# Patient Record
Sex: FEMALE | Race: BLACK OR AFRICAN AMERICAN | NOT HISPANIC OR LATINO | Employment: STUDENT | URBAN - METROPOLITAN AREA
[De-identification: names, ages, dates, MRNs, and addresses within clinical notes are randomized per-mention and may not be internally consistent; named-entity substitution may affect disease eponyms.]

---

## 2024-04-12 ENCOUNTER — OFFICE VISIT (OUTPATIENT)
Dept: ORTHOPEDIC SURGERY | Facility: HOSPITAL | Age: 34
End: 2024-04-12
Payer: COMMERCIAL

## 2024-04-12 VITALS — HEIGHT: 66 IN | WEIGHT: 205 LBS | BODY MASS INDEX: 32.95 KG/M2

## 2024-04-12 DIAGNOSIS — M79.672 LEFT FOOT PAIN: ICD-10-CM

## 2024-04-12 RX ORDER — AMLODIPINE BESYLATE 5 MG/1
5 TABLET ORAL
COMMUNITY
Start: 2023-10-06

## 2024-04-12 RX ORDER — MONTELUKAST SODIUM 10 MG/1
1 TABLET ORAL NIGHTLY
COMMUNITY
Start: 2015-06-18

## 2024-04-12 ASSESSMENT — PAIN SCALES - GENERAL: PAINLEVEL_OUTOF10: 4

## 2024-04-12 ASSESSMENT — PAIN - FUNCTIONAL ASSESSMENT: PAIN_FUNCTIONAL_ASSESSMENT: 0-10

## 2024-04-12 ASSESSMENT — PAIN DESCRIPTION - DESCRIPTORS: DESCRIPTORS: THROBBING;SHARP

## 2024-04-15 ENCOUNTER — HOSPITAL ENCOUNTER (OUTPATIENT)
Dept: RADIOLOGY | Facility: HOSPITAL | Age: 34
Discharge: HOME | End: 2024-04-15
Payer: COMMERCIAL

## 2024-04-15 ENCOUNTER — OFFICE VISIT (OUTPATIENT)
Dept: ORTHOPEDIC SURGERY | Facility: HOSPITAL | Age: 34
End: 2024-04-15
Payer: COMMERCIAL

## 2024-04-15 VITALS — WEIGHT: 205 LBS | BODY MASS INDEX: 32.95 KG/M2 | HEIGHT: 66 IN

## 2024-04-15 DIAGNOSIS — S92.512A CLOSED DISPLACED FRACTURE OF PROXIMAL PHALANX OF LESSER TOE OF LEFT FOOT, INITIAL ENCOUNTER: ICD-10-CM

## 2024-04-15 DIAGNOSIS — M79.672 LEFT FOOT PAIN: ICD-10-CM

## 2024-04-15 DIAGNOSIS — M79.672 LEFT FOOT PAIN: Primary | ICD-10-CM

## 2024-04-15 PROCEDURE — 99214 OFFICE O/P EST MOD 30 MIN: CPT | Performed by: PHYSICIAN ASSISTANT

## 2024-04-15 PROCEDURE — 1036F TOBACCO NON-USER: CPT | Performed by: PHYSICIAN ASSISTANT

## 2024-04-15 PROCEDURE — 73660 X-RAY EXAM OF TOE(S): CPT | Mod: LT

## 2024-04-15 ASSESSMENT — PAIN DESCRIPTION - DESCRIPTORS: DESCRIPTORS: THROBBING;TINGLING

## 2024-04-15 ASSESSMENT — PAIN - FUNCTIONAL ASSESSMENT: PAIN_FUNCTIONAL_ASSESSMENT: 0-10

## 2024-04-15 ASSESSMENT — PAIN SCALES - GENERAL: PAINLEVEL_OUTOF10: 6

## 2024-04-25 ENCOUNTER — HOSPITAL ENCOUNTER (OUTPATIENT)
Dept: RADIOLOGY | Facility: EXTERNAL LOCATION | Age: 34
Discharge: HOME | End: 2024-04-25
Payer: COMMERCIAL

## 2024-05-03 ENCOUNTER — APPOINTMENT (OUTPATIENT)
Dept: RADIOLOGY | Facility: HOSPITAL | Age: 34
End: 2024-05-03
Payer: COMMERCIAL

## 2024-05-06 ENCOUNTER — OFFICE VISIT (OUTPATIENT)
Dept: ORTHOPEDIC SURGERY | Facility: HOSPITAL | Age: 34
End: 2024-05-06
Payer: COMMERCIAL

## 2024-05-06 ENCOUNTER — HOSPITAL ENCOUNTER (OUTPATIENT)
Dept: RADIOLOGY | Facility: HOSPITAL | Age: 34
Discharge: HOME | End: 2024-05-06
Payer: COMMERCIAL

## 2024-05-06 DIAGNOSIS — M79.672 LEFT FOOT PAIN: ICD-10-CM

## 2024-05-06 PROCEDURE — 99213 OFFICE O/P EST LOW 20 MIN: CPT | Performed by: PHYSICIAN ASSISTANT

## 2024-05-06 PROCEDURE — 73630 X-RAY EXAM OF FOOT: CPT | Mod: LEFT SIDE | Performed by: RADIOLOGY

## 2024-05-06 PROCEDURE — 73630 X-RAY EXAM OF FOOT: CPT | Mod: LT

## 2024-05-10 NOTE — PROGRESS NOTES
NPV-   History of Present Illness  33 y.o.female here for fuv 4th toe pain  1. Left foot pain  XR toe left 2+ views    XR toe left 2+ views         Mechanism of injury: stubbed toe  Date of Injury/Pain: 4/12/24  Location of pain: 4th toe  Frequency of Pain: worse with walking  Associated symptoms? Swelling.  Previous treatment? UC, walk in, post op shoe    27 point review of systems negative except what is stated in HPI    On examination of the left ankle/foot:  Normal gait  Normal alignment  Minimal swelling; No ecchymosis or erythema. Skin intact; no ulcers or lesions.   Normal ROM in  ankle plantarflexion, dorsiflexion, inversion and eversion; normal ROM in 2nd, 3rd, and 5th toe flexion/extension and 1st MTP flexion/extension; decreased in 4th toe  Normal strength in ankle plantarflexion, dorsiflexion, inversion and eversion; normal strength with great toe flexion/extension  Tenderness to palpation: 4th toe  No tenderness to palpation over the Achilles, medial and lateral malleolus, posterior tibial tendon, peroneal tendon, ATFL, deltoid ligament, talus or navicular.  no tenderness to palpation over heel, plantar arch, base of 1st metatarsal/2nd metatarsal, sesamoids, 5th metatarsal, medial cuneiform, navicular, 1st MTP joint or 2nd or 3rd intermetarsal space.  Neurovascularly intact. Good capillary refill. No sensory deficit to light touch. Normal proprioception. Dorsalis pedis and posterior tibial pulses 2+ bilaterally.                    - negative vertical lachman's  - negative shuck testing              I personally reviewed the patient's x-ray images and reports of the left foot. The xrays show a minimally displaced fracture of the 4th proximal phalanx.  No other other fractures or dislocation.  Normal joint spacing    ASSESSMENT: left foot 4th toe fracture    PLAN: I discussed with the patient the differential diagnosis, complex overuse and degenerative related nature of the condition(s) and available  treatment option(s). Patient was placed in a post  op shoe to be WBAT with crutches.  They were given boot instructions. They can pancho tape their toes as instructed.  Patient was given a handout and instructed on an at home stretching program.  They should do these exercises 3 times per day for 6 weeks and then daily. Patient can use OTC Voltaren gel, biofreeze or  aspercream for pain and continue to ice and elevate supported at the calf to reduce swelling. Patient will increase their dietary calcium intake (milk,yogurt) and should get 1200 mg of calcium per day. They will also take a vitamin D supplement. All the patient's questions were answered. The patient agrees with the above plan.  Follow up in 2 weeks with repeat right foot xrays with AP, lateral and oblique views to evaluate bone healing.

## 2024-06-03 ENCOUNTER — HOSPITAL ENCOUNTER (OUTPATIENT)
Dept: RADIOLOGY | Facility: HOSPITAL | Age: 34
Discharge: HOME | End: 2024-06-03
Payer: COMMERCIAL

## 2024-06-03 ENCOUNTER — OFFICE VISIT (OUTPATIENT)
Dept: ORTHOPEDIC SURGERY | Facility: HOSPITAL | Age: 34
End: 2024-06-03
Payer: COMMERCIAL

## 2024-06-03 VITALS — BODY MASS INDEX: 35.49 KG/M2 | WEIGHT: 213 LBS | HEIGHT: 65 IN

## 2024-06-03 DIAGNOSIS — M79.672 LEFT FOOT PAIN: ICD-10-CM

## 2024-06-03 DIAGNOSIS — S92.512A CLOSED DISPLACED FRACTURE OF PROXIMAL PHALANX OF LESSER TOE OF LEFT FOOT, INITIAL ENCOUNTER: Primary | ICD-10-CM

## 2024-06-03 PROCEDURE — 73630 X-RAY EXAM OF FOOT: CPT | Mod: LT

## 2024-06-03 PROCEDURE — 73630 X-RAY EXAM OF FOOT: CPT | Mod: LEFT SIDE | Performed by: RADIOLOGY

## 2024-06-03 PROCEDURE — 99213 OFFICE O/P EST LOW 20 MIN: CPT | Performed by: PHYSICIAN ASSISTANT

## 2024-06-03 PROCEDURE — 1036F TOBACCO NON-USER: CPT | Performed by: PHYSICIAN ASSISTANT

## 2024-06-03 ASSESSMENT — PAIN DESCRIPTION - DESCRIPTORS: DESCRIPTORS: ACHING

## 2024-06-03 ASSESSMENT — PAIN SCALES - GENERAL: PAINLEVEL_OUTOF10: 1

## 2024-06-03 ASSESSMENT — PAIN - FUNCTIONAL ASSESSMENT: PAIN_FUNCTIONAL_ASSESSMENT: 0-10
